# Patient Record
Sex: FEMALE | Race: WHITE | NOT HISPANIC OR LATINO | Employment: UNEMPLOYED | URBAN - NONMETROPOLITAN AREA
[De-identification: names, ages, dates, MRNs, and addresses within clinical notes are randomized per-mention and may not be internally consistent; named-entity substitution may affect disease eponyms.]

---
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
## 2023-08-15 ENCOUNTER — OFFICE VISIT (OUTPATIENT)
Dept: URGENT CARE | Facility: MEDICAL CENTER | Age: 18
End: 2023-08-15
Payer: COMMERCIAL

## 2023-08-15 VITALS
OXYGEN SATURATION: 99 % | WEIGHT: 190 LBS | TEMPERATURE: 99 F | BODY MASS INDEX: 30.53 KG/M2 | HEIGHT: 66 IN | HEART RATE: 93 BPM | RESPIRATION RATE: 20 BRPM

## 2023-08-15 DIAGNOSIS — B34.9 ACUTE VIRAL SYNDROME: Primary | ICD-10-CM

## 2023-08-15 LAB
SARS-COV-2 AG UPPER RESP QL IA: NEGATIVE
VALID CONTROL: NORMAL

## 2023-08-15 PROCEDURE — S9088 SERVICES PROVIDED IN URGENT: HCPCS | Performed by: PHYSICIAN ASSISTANT

## 2023-08-15 PROCEDURE — 99213 OFFICE O/P EST LOW 20 MIN: CPT | Performed by: PHYSICIAN ASSISTANT

## 2023-08-15 PROCEDURE — 87811 SARS-COV-2 COVID19 W/OPTIC: CPT | Performed by: PHYSICIAN ASSISTANT

## 2023-08-15 NOTE — LETTER
August 15, 2023     Patient: Kathy Lozano   YOB: 2005   Date of Visit: 8/15/2023       To Whom It May Concern: It is my medical opinion that Stephanie Mae should not return to work until 08/21/2023. If you have any questions or concerns, please don't hesitate to call.          Sincerely,        Marcela Maciel PA-C    CC: No Recipients

## 2023-08-15 NOTE — PROGRESS NOTES
[facility]        NAME: Amna Donald is a 21 y.o. female  : 2005    MRN: [id number]  DATE: August 15, 2023  TIME: 5:07 PM    Assessment and Plan   Acute viral syndrome [B34.9]  1. Acute viral syndrome  Poct Covid 19 Rapid Antigen Test            Patient Instructions       Follow up with PCP in 3-5 days. Proceed to  ER if symptoms worsen. Chief Complaint     Chief Complaint   Patient presents with   • COVID-19 Exposure     Pt. Started with dry throat and fatigue last night, pt. Boyfriend tested positive for covid over the weekend and she was directly exposed          History of Present Illness       Patient is a 22 y/o/f presenting to [facility] with dry throat and fatigue. Patient boyfriend tested positive for Covid-19 today. Patient boyfriend began with symptoms 2 days ago, patient had direct exposure. Pt denies cough, CP or SOB. Review of Systems   Review of Systems   Constitutional: Positive for fatigue. Negative for chills and fever. HENT: Positive for sore throat. Negative for ear pain. Eyes: Negative for pain and visual disturbance. Respiratory: Negative for cough and shortness of breath. Cardiovascular: Negative for chest pain and palpitations. Gastrointestinal: Negative for abdominal pain and vomiting. Genitourinary: Negative for dysuria and hematuria. Musculoskeletal: Negative for arthralgias and back pain. Skin: Negative for color change and rash. Neurological: Negative for seizures and syncope. All other systems reviewed and are negative.         Current Medications       Current Outpatient Medications:   •  cyproheptadine (PERIACTIN) 4 mg tablet, Take 4 mg by mouth daily (Patient not taking: Reported on 8/15/2023), Disp: , Rfl:     Current Allergies     Allergies as of 08/15/2023   • (No Known Allergies)            The following portions of the patient's history were reviewed and updated as appropriate: allergies, current medications, past family history, past medical history, past social history, past surgical history and problem list.     History reviewed. No pertinent past medical history. History reviewed. No pertinent surgical history. History reviewed. No pertinent family history. Medications have been verified. Objective   Pulse 93   Temp 99 °F (37.2 °C)   Resp 20   Ht 5' 6" (1.676 m)   Wt 86.2 kg (190 lb)   LMP 07/26/2023   SpO2 99%   BMI 30.67 kg/m²   Patient's last menstrual period was 07/26/2023. Physical Exam     Physical Exam  Constitutional:       Appearance: Normal appearance. HENT:      Head: Normocephalic and atraumatic. Nose: Nose normal.      Mouth/Throat:      Mouth: Mucous membranes are moist.   Eyes:      Extraocular Movements: Extraocular movements intact. Conjunctiva/sclera: Conjunctivae normal.      Pupils: Pupils are equal, round, and reactive to light. Cardiovascular:      Rate and Rhythm: Normal rate. Pulmonary:      Effort: Pulmonary effort is normal.   Musculoskeletal:         General: Normal range of motion. Cervical back: Normal range of motion and neck supple. Skin:     General: Skin is warm and dry. Capillary Refill: Capillary refill takes less than 2 seconds. Neurological:      General: No focal deficit present. Mental Status: She is alert and oriented to person, place, and time.    Psychiatric:         Mood and Affect: Mood normal.         Behavior: Behavior normal.